# Patient Record
Sex: FEMALE | Race: WHITE | NOT HISPANIC OR LATINO | Employment: FULL TIME | URBAN - METROPOLITAN AREA
[De-identification: names, ages, dates, MRNs, and addresses within clinical notes are randomized per-mention and may not be internally consistent; named-entity substitution may affect disease eponyms.]

---

## 2018-01-16 ENCOUNTER — OFFICE VISIT CONVERTED (OUTPATIENT)
Dept: SURGERY | Facility: CLINIC | Age: 23
End: 2018-01-16
Attending: SURGERY

## 2019-07-25 ENCOUNTER — HOSPITAL ENCOUNTER (OUTPATIENT)
Dept: ULTRASOUND IMAGING | Facility: HOSPITAL | Age: 24
Discharge: HOME | End: 2019-07-25
Attending: OBSTETRICS & GYNECOLOGY

## 2020-11-13 ENCOUNTER — HOSPITAL ENCOUNTER (OUTPATIENT)
Dept: URGENT CARE | Facility: CLINIC | Age: 25
Discharge: HOME | End: 2020-11-13
Attending: NURSE PRACTITIONER

## 2020-11-15 LAB — BACTERIA SPEC AEROBE CULT: NORMAL

## 2020-11-18 LAB — SARS-COV-2 RNA SPEC QL NAA+PROBE: NOT DETECTED

## 2021-05-16 VITALS — WEIGHT: 220 LBS | RESPIRATION RATE: 14 BRPM | HEIGHT: 64 IN | BODY MASS INDEX: 37.56 KG/M2

## 2022-06-03 RX ORDER — NORELGESTROMIN AND ETHINLY ESTRADIOL 150; 35 UG/D; UG/D
PATCH TRANSDERMAL
Qty: 3 PATCH | Refills: 1 | Status: SHIPPED | OUTPATIENT
Start: 2022-06-03 | End: 2022-07-20 | Stop reason: SDUPTHER

## 2022-07-20 ENCOUNTER — OFFICE VISIT (OUTPATIENT)
Dept: OBSTETRICS AND GYNECOLOGY | Facility: CLINIC | Age: 27
End: 2022-07-20

## 2022-07-20 VITALS
SYSTOLIC BLOOD PRESSURE: 134 MMHG | BODY MASS INDEX: 29.88 KG/M2 | DIASTOLIC BLOOD PRESSURE: 91 MMHG | HEIGHT: 64 IN | HEART RATE: 78 BPM | WEIGHT: 175 LBS

## 2022-07-20 DIAGNOSIS — Z01.419 WOMEN'S ANNUAL ROUTINE GYNECOLOGICAL EXAMINATION: Primary | ICD-10-CM

## 2022-07-20 DIAGNOSIS — Z11.3 SCREEN FOR STD (SEXUALLY TRANSMITTED DISEASE): ICD-10-CM

## 2022-07-20 DIAGNOSIS — Z30.45 ENCOUNTER FOR SURVEILLANCE OF TRANSDERMAL PATCH HORMONAL CONTRACEPTIVE DEVICE: ICD-10-CM

## 2022-07-20 PROCEDURE — 87491 CHLMYD TRACH DNA AMP PROBE: CPT | Performed by: OBSTETRICS & GYNECOLOGY

## 2022-07-20 PROCEDURE — 99395 PREV VISIT EST AGE 18-39: CPT | Performed by: OBSTETRICS & GYNECOLOGY

## 2022-07-20 PROCEDURE — 87661 TRICHOMONAS VAGINALIS AMPLIF: CPT | Performed by: OBSTETRICS & GYNECOLOGY

## 2022-07-20 PROCEDURE — 87591 N.GONORRHOEAE DNA AMP PROB: CPT | Performed by: OBSTETRICS & GYNECOLOGY

## 2022-07-20 PROCEDURE — G0123 SCREEN CERV/VAG THIN LAYER: HCPCS | Performed by: OBSTETRICS & GYNECOLOGY

## 2022-07-20 RX ORDER — FLUOXETINE 10 MG/1
20 CAPSULE ORAL EVERY MORNING
COMMUNITY
Start: 2022-07-05

## 2022-07-20 RX ORDER — NORELGESTROMIN AND ETHINLY ESTRADIOL 150; 35 UG/D; UG/D
1 PATCH TRANSDERMAL WEEKLY
Qty: 3 PATCH | Refills: 11 | Status: SHIPPED | OUTPATIENT
Start: 2022-07-20 | End: 2022-08-08

## 2022-07-20 NOTE — PROGRESS NOTES
"Well Woman Visit    CC: JUVENCIO     HPI:   27 y.o. who presents for a well woman exam. Menses q month x 4 days of bleeding. Moderate flow. Happy with current birth control.  Is currently using birth control patches.  She does wish to have STD testing.  She wishes to only have gonorrhea chlamydia testing done.      History: PMHx, Meds, Allergies, PSHx, Social Hx, and POBHx all reviewed and updated.        /91   Pulse 78   Ht 162.6 cm (64\")   Wt 79.4 kg (175 lb)   LMP 2022   Breastfeeding No   BMI 30.04 kg/m²     Physical Exam  Vitals and nursing note reviewed. Exam conducted with a chaperone present.   Constitutional:       General: She is not in acute distress.     Appearance: Normal appearance. She is not ill-appearing.   HENT:      Head: Normocephalic and atraumatic.   Eyes:      Extraocular Movements: Extraocular movements intact.   Neck:      Thyroid: No thyroid mass or thyromegaly.   Cardiovascular:      Rate and Rhythm: Regular rhythm.      Heart sounds: No murmur heard.  Pulmonary:      Effort: Pulmonary effort is normal.      Breath sounds: No wheezing.   Chest:   Breasts: Breasts are symmetrical.      Right: Normal. No swelling, bleeding, inverted nipple, mass, nipple discharge, skin change, tenderness, axillary adenopathy or supraclavicular adenopathy.      Left: Normal. No swelling, bleeding, inverted nipple, mass, nipple discharge, skin change, tenderness, axillary adenopathy or supraclavicular adenopathy.        Comments: There are bilateral nipple piercings  Abdominal:      General: Abdomen is flat. There is no distension.      Palpations: Abdomen is soft. There is no mass.      Tenderness: There is no abdominal tenderness. There is no rebound.   Genitourinary:     General: Normal vulva.      Exam position: Lithotomy position.      Pubic Area: No rash.       Labia:         Right: No rash, tenderness, lesion or injury.         Left: No rash, tenderness, lesion or injury.       " Urethra: No prolapse, urethral pain or urethral lesion.      Vagina: Normal. No vaginal discharge, tenderness or prolapsed vaginal walls.      Cervix: Normal.      Uterus: Normal.       Adnexa: Right adnexa normal and left adnexa normal.   Lymphadenopathy:      Upper Body:      Right upper body: No supraclavicular or axillary adenopathy.      Left upper body: No supraclavicular or axillary adenopathy.      Lower Body: No right inguinal adenopathy. No left inguinal adenopathy.   Skin:     General: Skin is warm and dry.   Neurological:      Mental Status: She is alert and oriented to person, place, and time.   Psychiatric:         Mood and Affect: Mood normal.         Behavior: Behavior normal.         Thought Content: Thought content normal.         ASSESSMENT AND PLAN:    Diagnoses and all orders for this visit:    1. Women's annual routine gynecological examination (Primary)  Assessment & Plan:  Pap  Recommend daily multivitamin with folic acid    Orders:  -     IGP,CtNgTv,rfx Aptima HPV ASCU    2. Screen for STD (sexually transmitted disease)  Assessment & Plan:  The patient desires gonorrhea and Chlamydia testing only    Orders:  -     IGP,CtNgTv,rfx Aptima HPV ASCU  -     Cancel: Hepatitis B Surface Antigen  -     Cancel: Hepatitis C Antibody  -     Cancel: HIV-1 & HIV-2 Antibodies  -     Cancel: T Pallidum Antibody (FTA-Ab)  -     Cancel: Chlamydia trachomatis, Neisseria gonorrhoeae, PCR - Swab, Cervix    3. Encounter for surveillance of transdermal patch hormonal contraceptive device  -     norelgestromin-ethinyl estradiol (Zafemy) 150-35 MCG/24HR; Place 1 patch on the skin as directed by provider 1 (One) Time Per Week.  Dispense: 3 patch; Refill: 11      Counseling:     All BC Options R/B/A/SE/E of each reviewed  OCP/Hormone use risk SOHAM  Track menses, RTO IF <q21d, >7d long, or heavy    Preventative:   s/p FLU vaccine this season  s/p COVID vaccine    She understands the importance of having any ordered  tests to be performed in a timely fashion.  The risks of not performing them include, but are not limited to, advanced cancer stages, bone loss from osteoporosis and/or subsequent increase in morbidity and/or mortality.  She is encouraged to review her results online and/or contact or office if she has questions.     Follow Up:  Return for Annual physical.    Brett Lozano MD  07/20/2022

## 2022-07-25 LAB
C TRACH RRNA CVX QL NAA+PROBE: NEGATIVE
CONV .: ABNORMAL
CYTOLOGIST CVX/VAG CYTO: ABNORMAL
CYTOLOGY CVX/VAG DOC CYTO: ABNORMAL
CYTOLOGY CVX/VAG DOC THIN PREP: ABNORMAL
DX ICD CODE: ABNORMAL
DX ICD CODE: ABNORMAL
HIV 1 & 2 AB SER-IMP: ABNORMAL
N GONORRHOEA RRNA CVX QL NAA+PROBE: NEGATIVE
OTHER STN SPEC: ABNORMAL
PATHOLOGIST CVX/VAG CYTO: ABNORMAL
RECOM F/U CVX/VAG CYTO: ABNORMAL
STAT OF ADQ CVX/VAG CYTO-IMP: ABNORMAL
T VAGINALIS RRNA SPEC QL NAA+PROBE: NEGATIVE

## 2022-07-26 ENCOUNTER — TELEPHONE (OUTPATIENT)
Dept: OBSTETRICS AND GYNECOLOGY | Facility: CLINIC | Age: 27
End: 2022-07-26

## 2022-07-26 NOTE — TELEPHONE ENCOUNTER
----- Message from Brett Lozano MD sent at 7/26/2022  7:23 AM EDT -----  Please notify the patient of her result and schedule colposcopy

## 2022-07-26 NOTE — TELEPHONE ENCOUNTER
Called to notify patient of her abnormal pap smear results. Patient is scheduled forl colposcopy on 8/16 at 11:00 and is aware of the appt day and time.

## 2022-08-07 DIAGNOSIS — Z30.45 ENCOUNTER FOR SURVEILLANCE OF TRANSDERMAL PATCH HORMONAL CONTRACEPTIVE DEVICE: ICD-10-CM

## 2022-08-08 RX ORDER — NORELGESTROMIN AND ETHINYL ESTRADIOL 150; 35 UG/D; UG/D
PATCH TRANSDERMAL
Qty: 3 PATCH | Refills: 11 | Status: SHIPPED | OUTPATIENT
Start: 2022-08-08 | End: 2023-03-06

## 2022-08-08 NOTE — TELEPHONE ENCOUNTER
Mago'd refill on Xulane x 1 year.  Last seen 7-20-22 Rx given that day for Zafemy #30 with 11 refills.  No appointment scheduled

## 2022-08-16 ENCOUNTER — OFFICE VISIT (OUTPATIENT)
Dept: OBSTETRICS AND GYNECOLOGY | Facility: CLINIC | Age: 27
End: 2022-08-16

## 2022-08-16 VITALS
HEIGHT: 64 IN | HEART RATE: 109 BPM | DIASTOLIC BLOOD PRESSURE: 100 MMHG | WEIGHT: 176.6 LBS | SYSTOLIC BLOOD PRESSURE: 151 MMHG | BODY MASS INDEX: 30.15 KG/M2

## 2022-08-16 DIAGNOSIS — Z01.812 PRE-PROCEDURAL LABORATORY EXAMINATION: ICD-10-CM

## 2022-08-16 DIAGNOSIS — R87.612 LGSIL ON PAP SMEAR OF CERVIX: Primary | ICD-10-CM

## 2022-08-16 PROBLEM — Z11.3 SCREEN FOR STD (SEXUALLY TRANSMITTED DISEASE): Status: RESOLVED | Noted: 2022-07-20 | Resolved: 2022-08-16

## 2022-08-16 PROBLEM — Z01.419 WOMEN'S ANNUAL ROUTINE GYNECOLOGICAL EXAMINATION: Status: RESOLVED | Noted: 2022-07-20 | Resolved: 2022-08-16

## 2022-08-16 LAB
B-HCG UR QL: NEGATIVE
EXPIRATION DATE: NORMAL
INTERNAL NEGATIVE CONTROL: NORMAL
INTERNAL POSITIVE CONTROL: NORMAL
Lab: NORMAL

## 2022-08-16 PROCEDURE — 81025 URINE PREGNANCY TEST: CPT | Performed by: OBSTETRICS & GYNECOLOGY

## 2022-08-16 PROCEDURE — 57454 BX/CURETT OF CERVIX W/SCOPE: CPT | Performed by: OBSTETRICS & GYNECOLOGY

## 2022-08-16 PROCEDURE — 88305 TISSUE EXAM BY PATHOLOGIST: CPT | Performed by: OBSTETRICS & GYNECOLOGY

## 2022-08-16 NOTE — PROGRESS NOTES
"  Colposcopy    Pap result: LGSIL  [facility] [place]:  Negative  Consent signed: Yes    CC: Presents for colposcopy     Procedure reviewed in detail.  She understands the potential risks include, but are not limited to, pain, bleeding, and infection.  Her questions have been answered.        Subjective:  No complaints.  Very nervous today.    Objective:  /100   Pulse 109   Ht 162.6 cm (64\")   Wt 80.1 kg (176 lb 9.6 oz)   LMP 07/25/2022 (Approximate)   Breastfeeding No   BMI 30.31 kg/m²     Physical Exam  Vitals and nursing note reviewed. Exam conducted with a chaperone present.   Constitutional:       General: She is not in acute distress.     Appearance: Normal appearance. She is not ill-appearing.   Genitourinary:     General: Normal vulva.      Exam position: Lithotomy position.      Pubic Area: No rash.       Labia:         Right: No rash, tenderness, lesion or injury.         Left: No rash, tenderness, lesion or injury.       Vagina: Normal.            Comments: ECC collected.  Cervical biopsy at the 1 o'clock position.  Acetowhite epithelium noted at the 1 o'clock position as well.  Monsel's applied for hemostasis.  There was excellent hemostasis after application of the Monsel's.  The patient tolerated the procedure well  Musculoskeletal:         General: No swelling.      Right lower leg: No edema.      Left lower leg: No edema.   Skin:     General: Skin is warm and dry.      Findings: No rash.   Neurological:      Mental Status: She is alert and oriented to person, place, and time.   Psychiatric:         Mood and Affect: Mood normal.         Behavior: Behavior normal.         Thought Content: Thought content normal.         Judgment: Judgment normal.          Assessment and Plan:  Diagnoses and all orders for this visit:    1. 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  Only very mild acetowhite epithelium noted at the 1 o'clock position on the cervical face.  Cervical " biopsy and ECC were collected.  Further management based on the biopsy results.  Recommend Gardasil vaccination    Orders:  -     Tissue Pathology Exam    2. Pre-procedural laboratory examination  -     POC Pregnancy, Urine        Counseling:    We discussed HPV in detail.  Incidence, transmission, course, remission, progression, types, cervical cancer, genital warts, monitoring, and importance of compliance were reviewed.  A HPV handout was provided if she desired., HPV vaccine was discussed and recommended if appropriate.  Written information was made available.  The vaccine protects against the 2 types of HPV that cause 70% of cervical cancer and 90% of genital warts.  Condoms were recommended.    She understands the need for continued follow up until she is cleared to return to routine screening pap smears.  She understands the importance of follow up and consequences with lack of follow up (i.e. potential for cervical cancer).  Follow up usually ranges every 6months - 12months.      PRECAUTIONS - It is common to have bright red spotting and dark discharge after today.  If she has had cervical biopsies, she is to avoid intercourse or tampons as directed for 7 days.  She can use OTC pain relievers prn.  She needs to return to office or ER (if after hours/weekends) if she has pelvic pain, bleeding > 1 pad/2 hours, discharge that has a bad vaginal odor or vaginal itching, fever > 101.5, or any other concerns.        Follow Up:  No follow-ups on file.      Brett Lozano MD  08/16/2022

## 2022-08-16 NOTE — ASSESSMENT & PLAN NOTE
Colposcopy performed.  Only very mild acetowhite epithelium noted at the 1 o'clock position on the cervical face.  Cervical biopsy and ECC were collected.  Further management based on the biopsy results.  Recommend Gardasil vaccination

## 2022-08-18 LAB
CYTO UR: NORMAL
LAB AP CASE REPORT: NORMAL
LAB AP CLINICAL INFORMATION: NORMAL
PATH REPORT.FINAL DX SPEC: NORMAL
PATH REPORT.GROSS SPEC: NORMAL

## 2022-08-18 NOTE — PROGRESS NOTES
Please notify the patient that her cervical biopsy and endocervical curettage were both normal.  I recommend that she have a repeat Pap smear and HPV testing in 1 year.  It is important that she not miss that Pap smear.

## 2023-02-07 ENCOUNTER — OFFICE VISIT (OUTPATIENT)
Dept: ENDOCRINOLOGY | Age: 28
End: 2023-02-07
Payer: COMMERCIAL

## 2023-02-07 VITALS
HEIGHT: 64 IN | SYSTOLIC BLOOD PRESSURE: 146 MMHG | BODY MASS INDEX: 30.42 KG/M2 | OXYGEN SATURATION: 99 % | TEMPERATURE: 97.7 F | WEIGHT: 178.2 LBS | DIASTOLIC BLOOD PRESSURE: 90 MMHG | HEART RATE: 90 BPM

## 2023-02-07 DIAGNOSIS — R94.6 ABNORMAL THYROID FUNCTION TEST: Primary | ICD-10-CM

## 2023-02-07 PROCEDURE — 99203 OFFICE O/P NEW LOW 30 MIN: CPT | Performed by: INTERNAL MEDICINE

## 2023-02-07 RX ORDER — MELATONIN
1000 DAILY
COMMUNITY

## 2023-02-07 RX ORDER — ARIPIPRAZOLE 5 MG/1
1 TABLET ORAL DAILY
COMMUNITY
Start: 2023-01-12

## 2023-02-07 RX ORDER — LANOLIN ALCOHOL/MO/W.PET/CERES
1000 CREAM (GRAM) TOPICAL DAILY
COMMUNITY

## 2023-02-07 NOTE — PROGRESS NOTES
"New Patient      Chief Complaint    Chief Complaint   Patient presents with   • THYROID EVALUATION     Pt states that energy levels have been low, weight is stable, does have family hx of thyroid disease (mother), does have a regular menstrual cycle, last start date was 02.02.2023. Pt states that she has been getting dizzy when she stands up, sometimes she passes out.         HPI:   Shayy Courtney is a 27 y.o. female sent to us for consultative evaluation and management of abnormal thyroid function tests.  She has had symptoms of \"feeling fatigued especially after I do anything,\" increased heart rate occasionally along with dizziness on standing up.  She sees psychiatry and saw her psychiatrist recommended to check her thyroid function tests along with a vitamin D and B12 levels.  Her thyroid function tests checked on February 7, 2023, showed that a TSH was 2.010 uIU/mL with a total T4 of 7.9 mcg/dL.  Her T3 resin uptake was low at 16 (24 - 39), and her free thyroxine index was normal at 1.3.  The low T3 recent uptake is what caused concern.  Her B12 was also low at 256 pg/mL and her vitamin D was only 12.9 ng/mL.  She is now taking vitamin D at 1000 international units daily along with sublingual B12.  She has a history of being a vegan but she has lately changed her diet and reintroduced chicken and turkey into her diet.  She does not have any prior history of thyroid disease and she has never been on thyroid medications.  Her mother had hypothyroidism and was on L-thyroxine replacement therapy.        Past Medical History:   Diagnosis Date   • Abnormal Pap smear of cervix    • Anxiety    • Chlamydia    • Depression    • HPV (human papilloma virus) infection    • Hyperlipidemia January 2023   • Hypoglycemia     I've always assumed i had low blood sugar because if i dont eat enough i get light headed and dizzy   • Hypothyroidism January 2023   • Vitamin D deficiency January 2023    Im staking supplements now    "       ROS:  Pertinent to this visit, only as mentioned above.  The rest was negative.    Social History     Socioeconomic History   • Marital status: Single   • Number of children: 0   Tobacco Use   • Smoking status: Some Days     Packs/day: 0.00     Years: 7.00     Pack years: 0.00     Types: Cigarettes, Electronic Cigarette   • Smokeless tobacco: Never   • Tobacco comments:     Trying to quit   Vaping Use   • Vaping Use: Every day   • Substances: Nicotine, Flavoring   • Devices: Disposable   Substance and Sexual Activity   • Alcohol use: Not Currently     Comment: Last drink was april 25 2022   • Drug use: Yes     Types: Marijuana   • Sexual activity: Yes     Partners: Male     Birth control/protection: Patch       Physical Exam:  GENERAL: She looks well.  HEENT: Normal examination.  NECK: No palpable thyroid enlargement or discrete thyroid nodules  LUNGS: Clear to auscultation bilaterally  CVS: RRR  EXTREMITIES: Normal examination.    Assessment:  1.  Normal thyroid function.  2.  Low vitamin B12 and severe vitamin D deficiency.      Diagnoses and all orders for this visit:    1. Abnormal thyroid function test (Primary)  -     TSH  -     T4, Free  -     Thyroid Peroxidase Antibody    Recommendations:  1.  We reviewed with Ms. Courtney, her thyroid function tests and talked about the function of a normal thyroid.  2.  We do recommend to obtain a free T4 along with TPO antibody levels, given the family history of hypothyroidism in her mother.  3.  We did encourage her to continue with over-the-counter chewable sublingual vitamin B12 and we asked her to increase her vitamin D to 5000 international units daily for the first month and then thereafter decrease it down to 2000 international units daily indefinitely.  4.  She will follow-up with a primary care physician and with her psychiatrist and come back to endocrinology only as needed.  5.  We gave her the opportunity to ask questions, which we answered and we  addressed her concerns.

## 2023-02-08 ENCOUNTER — PATIENT ROUNDING (BHMG ONLY) (OUTPATIENT)
Dept: ENDOCRINOLOGY | Age: 28
End: 2023-02-08
Payer: COMMERCIAL

## 2023-02-08 LAB
T4 FREE SERPL-MCNC: 1.02 NG/DL (ref 0.93–1.7)
THYROPEROXIDASE AB SERPL-ACNC: 10 IU/ML (ref 0–34)
TSH SERPL DL<=0.005 MIU/L-ACNC: 2 UIU/ML (ref 0.27–4.2)

## 2023-02-08 NOTE — PROGRESS NOTES
Please let this patient know that her thyroid peroxidase antibody levels were negative and she currently does not have any evidence of Hashimoto's thyroiditis.  No other treatment is necessary and she will follow-up with her primary care physician.  Thank you

## 2023-03-04 DIAGNOSIS — Z30.45 ENCOUNTER FOR SURVEILLANCE OF TRANSDERMAL PATCH HORMONAL CONTRACEPTIVE DEVICE: ICD-10-CM

## 2023-03-06 RX ORDER — NORELGESTROMIN AND ETHINLY ESTRADIOL 150; 35 UG/D; UG/D
PATCH TRANSDERMAL
Qty: 3 PATCH | Refills: 3 | Status: SHIPPED | OUTPATIENT
Start: 2023-03-06

## 2023-10-17 DIAGNOSIS — Z30.45 ENCOUNTER FOR SURVEILLANCE OF TRANSDERMAL PATCH HORMONAL CONTRACEPTIVE DEVICE: ICD-10-CM

## 2023-10-17 RX ORDER — NORELGESTROMIN AND ETHINYL ESTRADIOL 35; 150 UG/D; UG/D
PATCH TRANSDERMAL
Qty: 3 PATCH | Refills: 1 | Status: SHIPPED | OUTPATIENT
Start: 2023-10-17

## 2023-10-17 NOTE — TELEPHONE ENCOUNTER
Approved two additional refills on zafemy patches. Pt is due for her annual exam. This needs to be scheduled for further refills.

## 2023-10-19 DIAGNOSIS — Z30.45 ENCOUNTER FOR SURVEILLANCE OF TRANSDERMAL PATCH HORMONAL CONTRACEPTIVE DEVICE: ICD-10-CM

## 2023-10-19 RX ORDER — NORELGESTROMIN AND ETHINYL ESTRADIOL 35; 150 UG/D; UG/D
PATCH TRANSDERMAL
Qty: 9 PATCH | OUTPATIENT
Start: 2023-10-19